# Patient Record
Sex: MALE
[De-identification: names, ages, dates, MRNs, and addresses within clinical notes are randomized per-mention and may not be internally consistent; named-entity substitution may affect disease eponyms.]

---

## 2020-11-02 ENCOUNTER — APPOINTMENT (OUTPATIENT)
Dept: SURGERY | Facility: CLINIC | Age: 35
End: 2020-11-02

## 2020-11-02 PROBLEM — Z00.00 ENCOUNTER FOR PREVENTIVE HEALTH EXAMINATION: Status: ACTIVE | Noted: 2020-11-02

## 2020-11-19 ENCOUNTER — APPOINTMENT (OUTPATIENT)
Dept: SURGERY | Facility: CLINIC | Age: 35
End: 2020-11-19
Payer: MEDICAID

## 2020-11-19 VITALS
WEIGHT: 205 LBS | DIASTOLIC BLOOD PRESSURE: 91 MMHG | TEMPERATURE: 97.3 F | SYSTOLIC BLOOD PRESSURE: 147 MMHG | OXYGEN SATURATION: 99 % | BODY MASS INDEX: 35 KG/M2 | HEIGHT: 64 IN | HEART RATE: 90 BPM

## 2020-11-19 DIAGNOSIS — Z78.9 OTHER SPECIFIED HEALTH STATUS: ICD-10-CM

## 2020-11-19 DIAGNOSIS — E66.01 MORBID (SEVERE) OBESITY DUE TO EXCESS CALORIES: ICD-10-CM

## 2020-11-19 DIAGNOSIS — K46.9 UNSPECIFIED ABDOMINAL HERNIA W/OUT OBSTRUCTION OR GANGRENE: ICD-10-CM

## 2020-11-19 DIAGNOSIS — M54.9 DORSALGIA, UNSPECIFIED: ICD-10-CM

## 2020-11-19 DIAGNOSIS — Z56.0 UNEMPLOYMENT, UNSPECIFIED: ICD-10-CM

## 2020-11-19 PROCEDURE — 99203 OFFICE O/P NEW LOW 30 MIN: CPT

## 2020-11-19 SDOH — ECONOMIC STABILITY - INCOME SECURITY: UNEMPLOYMENT, UNSPECIFIED: Z56.0

## 2020-11-19 NOTE — ASSESSMENT
[FreeTextEntry1] : Impression: reducible  ventral hernia, as per patient is due tot the injury at work

## 2020-11-19 NOTE — HISTORY OF PRESENT ILLNESS
[de-identified] : Mr. COLE KHAN is a 35 year  old male who was referred by Dr. Ashwin Gary with the chief complaint of having a ventral hernia.  He reports having this condition for 6 months. He denies any trauma to the area, fever, nausea, vomiting, distension, night sweats and  loss of appetite.  Symptoms aggravated by cough and straining.  - He reports normal bowel movements .    He denies   previous abdominal surgeries or  wound infections.  he reports that the hernia is due to the injury at work. \par \par \par \par

## 2020-11-19 NOTE — PHYSICAL EXAM
[Alert] : alert [Oriented to Person] : oriented to person [Oriented to Place] : oriented to place [Oriented to Time] : oriented to time [Calm] : calm [de-identified] : He  is alert, well-groomed, and cheerful.\par   [de-identified] : anicteric.  Nasal mucosa pink, septum midline. Oral mucosa pink.  Tongue midline, Pharynx without exudates.\par   [de-identified] : Neck supple. Trachea midline. Thyroid isthmus barely palpable, lobes not felt.\par   [de-identified] : obese abdomen, reducible  ventral hernia, mildly tender.  The defect appears to be relatively small and the skin overlying the hernia is normal

## 2020-11-19 NOTE — CONSULT LETTER
[Dear  ___] : Dear  [unfilled], [Consult Letter:] : I had the pleasure of evaluating your patient, [unfilled]. [Please see my note below.] : Please see my note below. [Consult Closing:] : Thank you very much for allowing me to participate in the care of this patient.  If you have any questions, please do not hesitate to contact me. [Sincerely,] : Sincerely, [FreeTextEntry3] : Jose Rosas MD, FACS

## 2020-11-19 NOTE — PLAN
[FreeTextEntry1] : Mr. SHILA KHAN  was told significance of findings, options, risks and benefits were explained.  Informed consent for ventral hernia repair and potential risks, benefits and alternatives (surgical options were discussed including non-surgical options or the option of no surgery) to the planned surgery were discussed in depth.  All surgical options were discussed including non-surgical treatments.  He wishes to proceed with surgery.  We will plan for surgery on at the next available date, pending any required insurance pre-certification or pre-approval. He agrees to obtain any necessary pre-operative evaluations and testing prior to surgery. \par he was advised to lose weight. I reviewed importance of behavioral modification. Nutrition was  reviewed and reinforced, including the importance  of making healthy food choices. The importance of maintaining regular exercise/physical activity also reinforced. Recommend patient to see nutritionist. \par Patient advised to seek immediate medical attention with any acute change in symptoms or with the development of any new or worsening symptoms.  Patient's questions and concerns addressed to patient's satisfaction, and patient verbalized an understanding of the information discussed.\par \par \par

## 2021-02-01 ENCOUNTER — APPOINTMENT (OUTPATIENT)
Dept: SURGERY | Facility: CLINIC | Age: 36
End: 2021-02-01
Payer: MEDICAID

## 2021-02-08 ENCOUNTER — APPOINTMENT (OUTPATIENT)
Dept: SURGERY | Facility: CLINIC | Age: 36
End: 2021-02-08
Payer: MEDICAID

## 2021-02-18 ENCOUNTER — APPOINTMENT (OUTPATIENT)
Dept: SURGERY | Facility: CLINIC | Age: 36
End: 2021-02-18
Payer: MEDICAID

## 2021-02-25 ENCOUNTER — APPOINTMENT (OUTPATIENT)
Dept: SURGERY | Facility: CLINIC | Age: 36
End: 2021-02-25
Payer: MEDICAID

## 2021-02-25 VITALS
HEIGHT: 64 IN | HEART RATE: 75 BPM | TEMPERATURE: 98 F | WEIGHT: 205 LBS | SYSTOLIC BLOOD PRESSURE: 156 MMHG | OXYGEN SATURATION: 99 % | BODY MASS INDEX: 35 KG/M2 | DIASTOLIC BLOOD PRESSURE: 105 MMHG

## 2021-02-25 DIAGNOSIS — K43.9 VENTRAL HERNIA W/OUT OBSTRUCTION OR GANGRENE: ICD-10-CM

## 2021-02-25 DIAGNOSIS — Z01.818 ENCOUNTER FOR OTHER PREPROCEDURAL EXAMINATION: ICD-10-CM

## 2021-02-25 PROCEDURE — 99214 OFFICE O/P EST MOD 30 MIN: CPT

## 2021-02-25 PROCEDURE — 99072 ADDL SUPL MATRL&STAF TM PHE: CPT

## 2021-02-25 NOTE — PHYSICAL EXAM
[Alert] : alert [Oriented to Person] : oriented to person [Oriented to Place] : oriented to place [Oriented to Time] : oriented to time [Calm] : calm [de-identified] : He  is alert, well-groomed, and cheerful.\par   [de-identified] : anicteric.  Nasal mucosa pink, septum midline. Oral mucosa pink.  Tongue midline, Pharynx without exudates.\par   [de-identified] : Neck supple. Trachea midline. Thyroid isthmus barely palpable, lobes not felt.\par   [de-identified] : obese abdomen, reducible  ventral hernia, mildly tender.  The defect appears to be relatively small and the skin overlying the hernia is normal

## 2021-02-25 NOTE — HISTORY OF PRESENT ILLNESS
[de-identified] : Mr. COLE KHAN is a 35 year  old male who was referred in November 2020  by Dr. Ashwin Gary with the chief complaint of having  a ventral hernia that occurred after injury at work .  he was advised to have it repaired. he returns today to schedule and discuss the surgery.  He denies any trauma to the area, fever, nausea, vomiting, distension, night sweats and  loss of appetite.  Symptoms aggravated by cough and straining.  - He reports normal bowel movements .    He denies   previous abdominal surgeries or  wound infections. \par \par \par \par   [de-identified] : Patient reports no interval changes to her overall health status or medical history

## 2022-08-22 ENCOUNTER — NON-APPOINTMENT (OUTPATIENT)
Age: 37
End: 2022-08-22

## 2022-11-28 ENCOUNTER — NON-APPOINTMENT (OUTPATIENT)
Age: 37
End: 2022-11-28

## 2023-05-16 ENCOUNTER — NON-APPOINTMENT (OUTPATIENT)
Age: 38
End: 2023-05-16

## 2024-02-01 ENCOUNTER — NON-APPOINTMENT (OUTPATIENT)
Age: 39
End: 2024-02-01

## 2024-03-26 ENCOUNTER — NON-APPOINTMENT (OUTPATIENT)
Age: 39
End: 2024-03-26

## 2025-04-09 ENCOUNTER — EMERGENCY (EMERGENCY)
Facility: HOSPITAL | Age: 40
LOS: 1 days | End: 2025-04-09
Attending: EMERGENCY MEDICINE
Payer: SELF-PAY

## 2025-04-09 VITALS
OXYGEN SATURATION: 98 % | DIASTOLIC BLOOD PRESSURE: 89 MMHG | SYSTOLIC BLOOD PRESSURE: 145 MMHG | WEIGHT: 180.78 LBS | RESPIRATION RATE: 18 BRPM | TEMPERATURE: 99 F | HEIGHT: 64.57 IN | HEART RATE: 67 BPM

## 2025-04-09 LAB
ALBUMIN SERPL ELPH-MCNC: 3.7 G/DL — SIGNIFICANT CHANGE UP (ref 3.5–5)
ALP SERPL-CCNC: 112 U/L — SIGNIFICANT CHANGE UP (ref 40–120)
ALT FLD-CCNC: 28 U/L DA — SIGNIFICANT CHANGE UP (ref 10–60)
ANION GAP SERPL CALC-SCNC: 7 MMOL/L — SIGNIFICANT CHANGE UP (ref 5–17)
APPEARANCE UR: CLEAR — SIGNIFICANT CHANGE UP
AST SERPL-CCNC: 18 U/L — SIGNIFICANT CHANGE UP (ref 10–40)
BASOPHILS # BLD AUTO: 0.05 K/UL — SIGNIFICANT CHANGE UP (ref 0–0.2)
BASOPHILS NFR BLD AUTO: 0.6 % — SIGNIFICANT CHANGE UP (ref 0–2)
BILIRUB SERPL-MCNC: 0.7 MG/DL — SIGNIFICANT CHANGE UP (ref 0.2–1.2)
BILIRUB UR-MCNC: NEGATIVE — SIGNIFICANT CHANGE UP
BUN SERPL-MCNC: 8 MG/DL — SIGNIFICANT CHANGE UP (ref 7–18)
CALCIUM SERPL-MCNC: 8.6 MG/DL — SIGNIFICANT CHANGE UP (ref 8.4–10.5)
CHLORIDE SERPL-SCNC: 106 MMOL/L — SIGNIFICANT CHANGE UP (ref 96–108)
CK SERPL-CCNC: 112 U/L — SIGNIFICANT CHANGE UP (ref 35–232)
CO2 SERPL-SCNC: 25 MMOL/L — SIGNIFICANT CHANGE UP (ref 22–31)
COLOR SPEC: YELLOW — SIGNIFICANT CHANGE UP
CREAT SERPL-MCNC: 0.75 MG/DL — SIGNIFICANT CHANGE UP (ref 0.5–1.3)
DIFF PNL FLD: NEGATIVE — SIGNIFICANT CHANGE UP
EGFR: 118 ML/MIN/1.73M2 — SIGNIFICANT CHANGE UP
EGFR: 118 ML/MIN/1.73M2 — SIGNIFICANT CHANGE UP
EOSINOPHIL # BLD AUTO: 0.01 K/UL — SIGNIFICANT CHANGE UP (ref 0–0.5)
EOSINOPHIL NFR BLD AUTO: 0.1 % — SIGNIFICANT CHANGE UP (ref 0–6)
GLUCOSE SERPL-MCNC: 108 MG/DL — HIGH (ref 70–99)
GLUCOSE UR QL: NEGATIVE MG/DL — SIGNIFICANT CHANGE UP
HCT VFR BLD CALC: 44.2 % — SIGNIFICANT CHANGE UP (ref 39–50)
HGB BLD-MCNC: 14.9 G/DL — SIGNIFICANT CHANGE UP (ref 13–17)
IMM GRANULOCYTES NFR BLD AUTO: 0.2 % — SIGNIFICANT CHANGE UP (ref 0–0.9)
KETONES UR-MCNC: 40 MG/DL
LEUKOCYTE ESTERASE UR-ACNC: NEGATIVE — SIGNIFICANT CHANGE UP
LIDOCAIN IGE QN: 30 U/L — SIGNIFICANT CHANGE UP (ref 13–75)
LYMPHOCYTES # BLD AUTO: 1.07 K/UL — SIGNIFICANT CHANGE UP (ref 1–3.3)
LYMPHOCYTES # BLD AUTO: 12.6 % — LOW (ref 13–44)
MCHC RBC-ENTMCNC: 29.2 PG — SIGNIFICANT CHANGE UP (ref 27–34)
MCHC RBC-ENTMCNC: 33.7 G/DL — SIGNIFICANT CHANGE UP (ref 32–36)
MCV RBC AUTO: 86.7 FL — SIGNIFICANT CHANGE UP (ref 80–100)
MONOCYTES # BLD AUTO: 0.45 K/UL — SIGNIFICANT CHANGE UP (ref 0–0.9)
MONOCYTES NFR BLD AUTO: 5.3 % — SIGNIFICANT CHANGE UP (ref 2–14)
NEUTROPHILS # BLD AUTO: 6.87 K/UL — SIGNIFICANT CHANGE UP (ref 1.8–7.4)
NEUTROPHILS NFR BLD AUTO: 81.2 % — HIGH (ref 43–77)
NITRITE UR-MCNC: NEGATIVE — SIGNIFICANT CHANGE UP
NRBC BLD AUTO-RTO: 0 /100 WBCS — SIGNIFICANT CHANGE UP (ref 0–0)
PH UR: 7 — SIGNIFICANT CHANGE UP (ref 5–8)
PLATELET # BLD AUTO: 305 K/UL — SIGNIFICANT CHANGE UP (ref 150–400)
POTASSIUM SERPL-MCNC: 3.1 MMOL/L — LOW (ref 3.5–5.3)
POTASSIUM SERPL-SCNC: 3.1 MMOL/L — LOW (ref 3.5–5.3)
PROT SERPL-MCNC: 7.4 G/DL — SIGNIFICANT CHANGE UP (ref 6–8.3)
PROT UR-MCNC: NEGATIVE MG/DL — SIGNIFICANT CHANGE UP
RBC # BLD: 5.1 M/UL — SIGNIFICANT CHANGE UP (ref 4.2–5.8)
RBC # FLD: 14.4 % — SIGNIFICANT CHANGE UP (ref 10.3–14.5)
SODIUM SERPL-SCNC: 138 MMOL/L — SIGNIFICANT CHANGE UP (ref 135–145)
SP GR SPEC: 1.01 — SIGNIFICANT CHANGE UP (ref 1–1.03)
TROPONIN I, HIGH SENSITIVITY RESULT: 7.1 NG/L — SIGNIFICANT CHANGE UP
UROBILINOGEN FLD QL: 1 MG/DL — SIGNIFICANT CHANGE UP (ref 0.2–1)
WBC # BLD: 8.47 K/UL — SIGNIFICANT CHANGE UP (ref 3.8–10.5)
WBC # FLD AUTO: 8.47 K/UL — SIGNIFICANT CHANGE UP (ref 3.8–10.5)

## 2025-04-09 PROCEDURE — 93005 ELECTROCARDIOGRAM TRACING: CPT

## 2025-04-09 PROCEDURE — 99285 EMERGENCY DEPT VISIT HI MDM: CPT

## 2025-04-09 PROCEDURE — 81003 URINALYSIS AUTO W/O SCOPE: CPT

## 2025-04-09 PROCEDURE — 99284 EMERGENCY DEPT VISIT MOD MDM: CPT | Mod: 25

## 2025-04-09 PROCEDURE — 87086 URINE CULTURE/COLONY COUNT: CPT

## 2025-04-09 PROCEDURE — 70496 CT ANGIOGRAPHY HEAD: CPT | Mod: MC

## 2025-04-09 PROCEDURE — 83690 ASSAY OF LIPASE: CPT

## 2025-04-09 PROCEDURE — 82550 ASSAY OF CK (CPK): CPT

## 2025-04-09 PROCEDURE — 85025 COMPLETE CBC W/AUTO DIFF WBC: CPT

## 2025-04-09 PROCEDURE — 70498 CT ANGIOGRAPHY NECK: CPT | Mod: 26

## 2025-04-09 PROCEDURE — 84484 ASSAY OF TROPONIN QUANT: CPT

## 2025-04-09 PROCEDURE — 96374 THER/PROPH/DIAG INJ IV PUSH: CPT | Mod: XU

## 2025-04-09 PROCEDURE — 80053 COMPREHEN METABOLIC PANEL: CPT

## 2025-04-09 PROCEDURE — 70498 CT ANGIOGRAPHY NECK: CPT | Mod: MC

## 2025-04-09 PROCEDURE — 70496 CT ANGIOGRAPHY HEAD: CPT | Mod: 26

## 2025-04-09 PROCEDURE — 36415 COLL VENOUS BLD VENIPUNCTURE: CPT

## 2025-04-09 RX ORDER — ACETAMINOPHEN 500 MG/5ML
1000 LIQUID (ML) ORAL ONCE
Refills: 0 | Status: COMPLETED | OUTPATIENT
Start: 2025-04-09 | End: 2025-04-09

## 2025-04-09 RX ADMIN — Medication 1000 MILLILITER(S): at 11:52

## 2025-04-09 RX ADMIN — Medication 400 MILLIGRAM(S): at 13:34

## 2025-04-09 NOTE — ED PROVIDER NOTE - CLINICAL SUMMARY MEDICAL DECISION MAKING FREE TEXT BOX
Patient with left-sided head and neck pain sudden onset while cleaning the bathroom.  Supple neck but keeping eyes closed.

## 2025-04-09 NOTE — ED ADULT NURSE NOTE - OBJECTIVE STATEMENT
patient endorsing epigastric abdominal pain with dizziness and weakness x this morning. patient denies any medical hx, trauma, SOB, N, V, diarrhea, and chest pain.

## 2025-04-09 NOTE — ED PROVIDER NOTE - PHYSICAL EXAMINATION
Heart regular lungs clear abdomen soft nontender no midline spinal tenderness palpation no carotid bruit.  Patient is speaking full sentences but keeping eyes closed stating he still having headache.  Supple neck full range of motion.  2+ radial pulse bilaterally.  Strength sensation intact in both arms and legs.  Speech clear. NIH stroke scale 0.  Passed dysphagia eval.

## 2025-04-09 NOTE — ED PROVIDER NOTE - PROGRESS NOTE DETAILS
Patient is feeling better symptoms resolved CT unremarkable.  Home with PCP follow-up.  Abdomen nontender.

## 2025-04-09 NOTE — ED PROVIDER NOTE - OBJECTIVE STATEMENT
39-year-old male with medical problems presents with left-sided neck and headache today while cleaning the floor in the bathroom.  Atrial so experienced nausea and dizziness and headache.  No chest pain no shortness of breath.

## 2025-04-09 NOTE — ED ADULT TRIAGE NOTE - GLASGOW COMA SCALE: SCORE, MLM
15 Protopic Pregnancy And Lactation Text: This medication is Pregnancy Category C. It is unknown if this medication is excreted in breast milk when applied topically.

## 2025-04-09 NOTE — ED PROVIDER NOTE - NSFOLLOWUPINSTRUCTIONS_ED_ALL_ED_FT
Dolor de carri anabel    LO QUE NECESITA SABER:    ¿Qué es un dolor de carri anabel?El dolor de carri anabel es un dolor o doris molestia que puede empezar de repente y empeorar rápidamente. Usted puede tener un dolor de carri anabel sólo cuando siente estrés o come ciertos alimentos. Otro tipo dolor de carri anabel puede producirse todos los días y a veces varias veces al día.    ¿Cuáles son los tipos más comunes de dolor de carri anabel?    El dolor de carri tensionales el tipo de dolor de carri más común. Normalmente aparece por la tarde y se va al atardecer. El dolor generalmente es leve o moderado. La brandon brillante o el ruido terence podrían estorbarle. Generalmente el dolor se encuentra a través de la frente o en la parte posterior de la carri y con frecuencia sólo en un lado. Cristel dolor de carri podría ocurrir todos los leslie.    Las migrañasprovocan dolor de moderado a intenso. El dolor de carri dura de 1 a 3 días generalmente y muchas veces es recurrente. El dolor tiende a estar sólo en un lado chela puede cambiar de lado. La migraña se localiza en la sien o en la parte posterior de la carri o del louann. El dolor podría pulsar o estar anabel y continuo.    Doris migraña con aurasignifica que usted ve o siente algo antes de doris migraña. Podría william doris pequeña marian rodeada de líneas brillantes en zigzag. Otros signos o síntomas podrían seguir al aura.    El dolor de carri en racimose siente solamente en un lado. A menudo causa dolor severo y puede durar de 30 minutos a 2 horas. Cristel dolor de carri podría ocurrir 1 vez o 2 veces al día, a menudo a la noche. El dolor puede despertarlo.  Tipos de dolor de carri  ¿Qué causa el dolor de carri anabel?La causa de henriquez dolor de carri podría ser desconocida. Las siguientes condiciones pueden desencadenar un dolor de carri:    Estrés o tensión, horas o incluso días después de experimentar un hecho estresante.    Fatiga, falta de sueño o cambios en henriquez patrón de sueño habitual, o moreno doris siesta jessica el día    Menstruación, especialmente después del embarazo o del uso de píldoras anticonceptivas o terapia de reemplazo hormonal    Alimentos antonina embutidos, edulcorantes artificiales, alcohol, chocolate oscuro y el GMS    Repentinamente no contar con cafeína si por lo general consume cantidades elevadas    Un problema médico, antonina doris infección, dolor de diente, dolor de xin o de senos paranasales, problemas de tiroides o un tumor    Un traumatismo craneal  ¿Cómo se diagnostica y trata el tipo de dolor de carri anabel?El médico le pedirá que describa henriquez dolor y que lo califique en doris escala de 1 a 10. Infórmele con qué frecuencia tiene risa de carri y cuánto tiempo rapp. También se describen otros síntomas concomitantes con los risa de carri, antonina los mareos o la visión borrosa. Es posible que haya que hacerle pruebas, antonina doris tomografía computarizada, para asegurarse de que no hay doris fuga en ningún vaso sanguíneo.    Los medicamentosse puedes administrar para controlar o prevenir el dolor de carri. El medicamento dependerá del tipo de dolor de carri anabel que tenga. No espere a que el dolor sea muy intenso para moreno el medicamento. Puede moreno analgésicos sin prescripción médica, según sea necesario. Los ejemplos de éstos incluyen los SITA y el acetaminofén. Consulte con henriquez médico cuál medicamento es el adecuado para usted. Pregunte cuánto moreno y cuándo. Siga las indicaciones. Estos medicamentos pueden causar sangrado abdominal o problemas renales si no se los beverley correctamente.    La biorretroalimentaciónpodría usarse para ayudarlo a controlar henriquez estrés. Los electrodos (alambres) son colocados en henriquez cuerpo y conectados a un monitor. Usted aprenderá a cambiar la forma que henirquez cuerpo reacciona al estrés. Por ejemplo, usted aprende a disminuir henriquez latido cardíaco cuando se molesta.    La terapia cognitivo conductual,o el manejo del estrés pueden ser utilizadas junto con otras terapias para prevenir risa de carri.  ¿Qué puedo hacer para manejar mis síntomas?    Aplique hielo o caloren la yogesh donde henriquez hijo siente el dolor de carri. Utilice un paquete (compresa) de hielo o calor. Para un paquete de hielo, también puede colocar hielo molido en doris bolsa plástica. Cubra el paquete de hielo o la bolsa con doris toalla pequeña antes de aplicarla en la piel. Tanto el hielo antonina el calor ayudan a reducir el dolor, y el calor también contribuye a reducir los espasmos musculares. Aplique calor jessica 20 a 30 minutos cada 2 horas. Aplique hielo jessica 15 a 20 minutos cada hora. Aplique calor o hielo jessica el tiempo y la cantidad de días que se le indique. Usted puede alternar el calor y el hielo.    Relaje angela músculos.Acuéstese en doris posición cómoda y cierre angela ojos. Relaje angela músculos lentamente. Comience por los dedos de los pies y avance hacia arriba al ana cristina de henriquez cuerpo.    Registre en un diario angela risa de carri.Escriba cuándo comienzan y terminan angela migrañas. Incluya angela síntomas y qué estaba haciendo cuando comenzó la migraña. Registre lo que comió y lo que tomó las 24 horas previas al comienzo de henriquez migraña. Describa el dolor y dónde le duele: Lleve un registro de lo que hizo para tratar henriquez migraña y si obtuvo un resultado satisfactorio.  ¿Qué puedo hacer para evitar un dolor de carri anabel?    Evite cualquier cosa que provoque un dolor de carri anabel.Los ejemplos incluyen la exposición a sustancias químicas, las grandes altitudes o no dormir lo suficiente. Veronika doris rutina para dormir. Acuéstese y levántese todos los días a la misma hora. No utilice aparatos electrónicos antes de acostarse. Pueden provocarle un dolor de carri o impedirle dormir khalida.    No fume.La nicotina y otras sustancias químicas en los cigarrillos y puros pueden desencadenar un dolor de carri anabel o empeorarlo. Pida información a henriquez médico si usted actualmente fuma y necesita ayuda para dejar de fumar. Los cigarrillos electrónicos o el tabaco sin humo igualmente contienen nicotina. Consulte con henriquez médico antes de utilizar estos productos.    Limite el consumo de alcohol según le indicaron.El alcohol puede provocar un dolor de carri anabel o empeorarlo. Si usted tiene risa de carri de racimo, no cortez alcohol jessica un episodio. Para otros tipos de risa de carri, pregúntele a henriquez proveedor de atención médica si es seguro para usted beber alcohol. Pregunte cuál es la cantidad perez que puede beber y con qué frecuencia.    Ejercítese según indicaciones.El ejercicio puede reducir la tensión y ayudarlo a aliviar el dolor de carri. Propóngase hacer 30 minutos de actividad física afua todos los días de la semana. Henriquez médico puede ayudarle a crear un plan de ejercicios.    Consuma alimentos saludables y variados.Los alimentos saludables incluyen las frutas, verduras, productos lácteos bajos en grasa, sharon magras, pescado y frijoles cocidos. Henriquez médico o dietista puede ayudarle a crear planes de comidas si desea evitar los alimentos que provocan risa de carri.  ¿Cuándo jodie buscar atención inmediata?    Usted tiene dolor intenso.    Usted tiene entumecimiento en un lado de henriquez ghanshyam o cuerpo.    Usted tiene un dolor de carri que ocurre después de un golpe en la carri, doris caída u otro trauma.    Tiene dolor de carri, está olvidadizo o confundido o tiene dificultad para hablar.    Tiene dolor de carri, rigidez en el xin y fiebre.  ¿Cuándo jodie llamar a mi médico?    Usted tiene un dolor de carri julia y está vomitando.    Usted tiene dolor de carri todos los días y no se judson aun después de tratarlo.    Angela risa de carri cambian u ocurren nuevos síntomas cuando tiene dolor de carri.    Usted tiene preguntas o inquietudes acerca de henriquez condición o cuidado.  ACUERDOS SOBRE HENRIQUEZ CUIDADO:    Usted tiene el derecho de ayudar a planear henriquez cuidado. Aprenda todo lo que pueda sobre henriquez condición y antonina darle tratamiento. Discuta angela opciones de tratamiento con angela médicos para decidir el cuidado que usted desea recibir. Usted siempre tiene el derecho de rechazar el tratamiento.

## 2025-04-09 NOTE — ED PROVIDER NOTE - PATIENT PORTAL LINK FT
You can access the FollowMyHealth Patient Portal offered by [facility] by registering at the following website: [email or web address]. By joining [facility]’s FollowMyHealth portal, you will also be able to view your health information using other applications (apps) compatible with our system.

## 2025-04-09 NOTE — ED ADULT NURSE NOTE - NSFALLRISKINTERV_ED_ALL_ED

## 2025-04-10 LAB
CULTURE RESULTS: SIGNIFICANT CHANGE UP
SPECIMEN SOURCE: SIGNIFICANT CHANGE UP